# Patient Record
Sex: FEMALE | Race: WHITE
[De-identification: names, ages, dates, MRNs, and addresses within clinical notes are randomized per-mention and may not be internally consistent; named-entity substitution may affect disease eponyms.]

---

## 2017-03-24 ENCOUNTER — HOSPITAL ENCOUNTER (OUTPATIENT)
Dept: HOSPITAL 75 - RAD | Age: 57
End: 2017-03-24
Attending: FAMILY MEDICINE
Payer: COMMERCIAL

## 2017-03-24 DIAGNOSIS — Z12.31: Primary | ICD-10-CM

## 2017-03-24 PROCEDURE — 77067 SCR MAMMO BI INCL CAD: CPT

## 2017-03-27 NOTE — DIAGNOSTIC IMAGING REPORT
EXAMINATION:

Bilateral screening mammogram with a Computer Aided Detection

(CAD) system.



INDICATION:

Screening. No current complaints stated on the questionnaire.



COMPARISON:

03/03/2016.



FINDINGS:

The breasts are composed of heterogeneously dense parenchyma

which may decrease mammographic sensitivity. There is an

asymmetry along the upper aspect of the left MLO view with

apparent architectural distortion, favored to be related to

summation effect of parenchyma. The right breast demonstrates no

significant change. Benign-appearing calcifications are seen.



IMPRESSION:

Focal compression views and ultrasound evaluation for upper left

breast asymmetry would be recommended.



ACR BI-RADS Category 0: Incomplete. (Needs additional imaging

evaluation).

Result letter will be mailed to the patient.

Note: At least 10% of breast cancer is not imaged by mammography.



Dictated by:



Dictated on workstation # [id number]

## 2017-04-06 ENCOUNTER — HOSPITAL ENCOUNTER (OUTPATIENT)
Dept: HOSPITAL 75 - RAD | Age: 57
End: 2017-04-06
Attending: NURSE PRACTITIONER
Payer: COMMERCIAL

## 2017-04-06 DIAGNOSIS — R92.8: Primary | ICD-10-CM

## 2017-04-06 PROCEDURE — 76642 ULTRASOUND BREAST LIMITED: CPT

## 2017-04-06 NOTE — DIAGNOSTIC IMAGING REPORT
Left breast diagnostic mammogram.



CAD is utilized.



COMPARISON: 3/3/2016.



FINDINGS:

The breast parenchyma is heterogeneously dense which may

decrease mammographic sensitivity. There are benign-appearing

calcifications seen. Focal compression view demonstrates

persistent underlying asymmetry with no definite correlate on

the CC projection compression view.



IMPRESSION: Persistent asymmetry along the upper aspect of the

left MLO view with background dense parenchyma. No definite

correlate on the CC projection. This is indeterminate.

Ultrasound correlation is pending.



ACR BI-RADS Category 0: Incomplete. (Needs additional imaging

evaluation).

Result letter will be mailed to the patient.

Note: At least 10% of breast cancer is not imaged by mammography.



Dictated by:



Dictated on workstation # [id number]

## 2017-04-06 NOTE — DIAGNOSTIC IMAGING REPORT
Left breast ultrasound.



INDICATION: Asymmetry along the upper outer aspect of the left

breast.



FINDINGS:

Unremarkable breast parenchyma is seen with no focal lesion.



IMPRESSION: Negative study. The asymmetry in the upper outer

aspect of the left breast is indeterminate. The results are

discussed with the patient. She also indicated that she has

increasing fullness in the upper aspect of the right breast.

Further evaluation with breast MRI is recommended.



ACR BI-RADS Category 0: Incomplete. (Needs additional imaging

evaluation).



Dictated by:



Dictated on workstation # [id number]

## 2017-04-18 ENCOUNTER — HOSPITAL ENCOUNTER (OUTPATIENT)
Dept: HOSPITAL 75 - RAD | Age: 57
End: 2017-04-18
Attending: FAMILY MEDICINE
Payer: COMMERCIAL

## 2017-04-18 DIAGNOSIS — N64.89: Primary | ICD-10-CM

## 2017-04-18 LAB
BUN SERPL-MCNC: 15 MG/DL (ref 7–18)
BUN/CREAT SERPL: 18
CREAT SERPL-MCNC: 0.83 MG/DL (ref 0.6–1.3)
GFR SERPLBLD BASED ON 1.73 SQ M-ARVRAT: > 60 ML/MIN

## 2017-04-18 PROCEDURE — 36415 COLL VENOUS BLD VENIPUNCTURE: CPT

## 2017-04-18 PROCEDURE — 84520 ASSAY OF UREA NITROGEN: CPT

## 2017-04-18 PROCEDURE — 82565 ASSAY OF CREATININE: CPT

## 2017-04-18 PROCEDURE — 77059: CPT

## 2017-04-21 NOTE — DIAGNOSTIC IMAGING REPORT
TECHNIQUE: Utilizing 1.5 Gabi magnet, patient was placed in a

prone position with 8-channel dual breast coil utilized. Axial

STIR precontrasted image and axial T1 fat-sat postcontrast

high-resolution images obtained. Sagittal T2-weighted images

precontrast, bilaterally, as well. Sagittal vibrant temporal

images were obtained pre and post contrast with bolus technique

utilized of gadolinium. Images are postcontrast immediately and

subsequently for 7 minutes. Pre and post contrasted images are

then evaluated with [employer]ruist for evaluation of possible

angiogenesis.



INDICATION: Mammographic abnormality.



COMPARISON: April 6, 2017; March 24, 2017; March 3, 2016;

September 5, 2013; and May 17, 2012.



FINDINGS: The bilateral breast demonstrate mild background

glandularity. The bilateral breasts demonstrate minimal

background enhancement.



No significant axillary or internal mammary adenopathy.



Visualized portions of the upper abdomen are unremarkable.



An 8.1 x 3.2-cm fat-intensity mass is identified within the

right pectoral musculature. No suspicious associated

enhancement.



Minimal similar-appearing scattered foci of enhancement are

identified throughout the bilateral breasts, right greater than

left. These foci are below the color threshold of CAD stream. No

suspicious mass or non-mass enhancement within either breast. In

particular, no suspicious enhancement to correspond to

previously questioned asymmetry within the superior left breast

on prior mammography.



IMPRESSION: No evidence of malignancy. In particular, no

abnormality identified to correspond to the asymmetry within the

superior left breast noted on prior mammography. Followup left

breast mammogram is recommended in six months.



Large fat-density mass within the right pectoral musculature,

felt to relate to a lipoma.



ACR BI-RADS Category 2: Benign findings.



Result letter will be mailed to the patient.



Followup: Diagnostic left breast mammograms are recommended in

six months to monitor stability of the asymmetry within the

superior left breast which is not visualized sonographically or

on this current MRI.



Dictated by:



Dictated on workstation # [id number]

## 2019-01-29 ENCOUNTER — HOSPITAL ENCOUNTER (OUTPATIENT)
Dept: HOSPITAL 75 - RAD | Age: 59
End: 2019-01-29
Attending: OBSTETRICS & GYNECOLOGY
Payer: COMMERCIAL

## 2019-01-29 DIAGNOSIS — Z87.2: ICD-10-CM

## 2019-01-29 DIAGNOSIS — Z12.31: Primary | ICD-10-CM

## 2019-01-29 PROCEDURE — 77067 SCR MAMMO BI INCL CAD: CPT

## 2019-01-29 NOTE — DIAGNOSTIC IMAGING REPORT
EXAMINATION: Digital mammogram bilateral screening with 3D

tomosynthesis.



The current study was also evaluated with a Computer Aided

Detection (CAD) system.



INDICATION: Screening.



This study was compared to the prior exams of 03/24/2017,

03/03/2016, and 09/05/2013. At this time, there are no current

complaints.



FINDINGS: The fibroglandular tissue in both breasts is

heterogeneously dense. This does limit the sensitivity of this

exam. Overall, there does not appear to have been any significant

change when compared to the prior study. No primary or secondary

sign of malignancy is noted. 3D tomographic images fail to show

any sign of malignancy. 



IMPRESSION: There is no radiographic evidence for malignancy.



ACR BI-RADS Category 1: Negative.

Result letter will be mailed to the patient.

Note:  At least 10% of breast cancer is not imaged by

mammography.



Dictated by: 



  Dictated on workstation # [id number]

## 2019-01-31 ENCOUNTER — HOSPITAL ENCOUNTER (OUTPATIENT)
Dept: HOSPITAL 75 - RAD | Age: 59
End: 2019-01-31
Attending: FAMILY MEDICINE
Payer: COMMERCIAL

## 2019-01-31 DIAGNOSIS — Z12.2: Primary | ICD-10-CM

## 2019-01-31 DIAGNOSIS — F17.210: ICD-10-CM

## 2019-01-31 NOTE — DIAGNOSTIC IMAGING REPORT
EXAM: CT CHEST SCREENING WO



INDICATION: 45-pack-year smoking history. Current smoker.



COMPARISON: None



FINDINGS: Calcified granulomas in both lungs. Linear atelectasis

and/or scarring in the lung bases. No suspicious pulmonary nodule

or mass. No endobronchial lesions. No pleural effusion or

pneumothorax. Normal heart size. No pericardial effusion. No

mediastinal, hilar or axillary lymphadenopathy. Visualized upper

abdominal contents are unremarkable. No acute osseous findings.



IMPRESSION:



1. No suspicious pulmonary nodule or mass. Recommend continued

annual screening with low dose chest CT in 12 months.



2. No acute CT findings in the chest.



LUNG-RADS CATEGORY:  1.

MODIFIER:  None.



Please note that the low-dose technique of this chest CT is of

non-diagnostic quality.  This study is only intended for lung

cancer screening of high risk patients.



Dictated by: 



  Dictated on workstation # [id number]

## 2020-01-31 ENCOUNTER — HOSPITAL ENCOUNTER (OUTPATIENT)
Dept: HOSPITAL 75 - RAD | Age: 60
End: 2020-01-31
Attending: FAMILY MEDICINE
Payer: COMMERCIAL

## 2020-01-31 DIAGNOSIS — Z12.31: Primary | ICD-10-CM

## 2020-01-31 PROCEDURE — 77067 SCR MAMMO BI INCL CAD: CPT

## 2020-01-31 NOTE — DIAGNOSTIC IMAGING REPORT
INDICATION: Routine screening.



COMPARISON: Correlation is made with prior studies from

01/29/2019 and 03/24/2017.



TECHNIQUE: 2-D and 3-D bilateral screening mammography was

performed. The current study was also evaluated with a Computer

Aided Detection (CAD) system. 3-D tomosynthesis was also

performed and reviewed.



FINDINGS: Scattered fibroglandular densities are identified

bilaterally. There are benign calcifications bilaterally. No mass

or malignant-appearing microcalcifications are seen. Axillae are

unremarkable.



IMPRESSION: No mammographic features suspicious for malignancy

are identified.



ACR BI-RADS Category 2: Benign findings.

Result letter will be mailed to the patient.

Note: At least 10% of breast cancer is not imaged by mammography.



Dictated by: 



  Dictated on workstation # [id number]

## 2020-02-07 ENCOUNTER — HOSPITAL ENCOUNTER (OUTPATIENT)
Dept: HOSPITAL 75 - RAD | Age: 60
End: 2020-02-07
Attending: FAMILY MEDICINE
Payer: COMMERCIAL

## 2020-02-07 DIAGNOSIS — F17.200: ICD-10-CM

## 2020-02-07 DIAGNOSIS — Z12.2: Primary | ICD-10-CM

## 2020-02-07 NOTE — DIAGNOSTIC IMAGING REPORT
EXAMINATION: CT Chest without contrast (lung screening).



TECHNIQUE: Multiple contiguous axial images were obtained through

the chest without the use of intravenous contrast according to

lung cancer screening protocol. All CT scans use one or more of

the following dose optimizing techniques: automated exposure

control, MA and/or KvP adjustment based on a patient size and

exam type, or iterative reconstruction. 



HISTORY: 45 pack year history of smoking.



COMPARISON: 01/31/2019



FINDINGS: 



There is no edema or pneumonia. No pleural effusion. No

pneumothorax. No suspicious nodules.



Heart size is normal. No pericardial effusion. Aorta is normal in

caliber. There is no axillary or supraclavicular lymphadenopathy.

There is no mediastinal lymphadenopathy.



Limited views of the upper abdomen are normal.



There are no suspicious osseus lesions.



IMPRESSION:



1. No suspicious pulmonary nodules.



LUNG-RADS CATEGORY:  1

MODIFIER:  None.

OTHER SIGNIFICANT FINDINGS:  None.



Dictated by: 



  Dictated on workstation # [id number]

## 2020-09-22 ENCOUNTER — HOSPITAL ENCOUNTER (OUTPATIENT)
Dept: HOSPITAL 75 - SLEEP | Age: 60
Discharge: HOME | End: 2020-09-22
Attending: NURSE PRACTITIONER
Payer: COMMERCIAL

## 2020-09-22 DIAGNOSIS — G47.10: ICD-10-CM

## 2020-09-22 DIAGNOSIS — G47.33: Primary | ICD-10-CM

## 2021-01-13 ENCOUNTER — HOSPITAL ENCOUNTER (OUTPATIENT)
Dept: HOSPITAL 75 - LABNPT | Age: 61
End: 2021-01-13
Attending: OTOLARYNGOLOGY
Payer: COMMERCIAL

## 2021-01-13 DIAGNOSIS — Z20.822: Primary | ICD-10-CM

## 2021-01-13 PROCEDURE — 87635 SARS-COV-2 COVID-19 AMP PRB: CPT

## 2021-01-15 ENCOUNTER — HOSPITAL ENCOUNTER (OUTPATIENT)
Dept: HOSPITAL 75 - SLEEP | Age: 61
End: 2021-01-15
Attending: NURSE PRACTITIONER
Payer: COMMERCIAL

## 2021-01-15 DIAGNOSIS — G47.33: Primary | ICD-10-CM

## 2021-01-15 PROCEDURE — 95811 POLYSOM 6/>YRS CPAP 4/> PARM: CPT

## 2021-03-09 ENCOUNTER — HOSPITAL ENCOUNTER (OUTPATIENT)
Dept: HOSPITAL 75 - CARD | Age: 61
End: 2021-03-09
Attending: FAMILY MEDICINE
Payer: COMMERCIAL

## 2021-03-09 DIAGNOSIS — F17.210: ICD-10-CM

## 2021-03-09 DIAGNOSIS — Z12.31: Primary | ICD-10-CM

## 2021-03-09 DIAGNOSIS — I25.10: ICD-10-CM

## 2021-03-09 DIAGNOSIS — Z86.16: ICD-10-CM

## 2021-03-09 PROCEDURE — 71271 CT THORAX LUNG CANCER SCR C-: CPT

## 2021-03-09 PROCEDURE — 93017 CV STRESS TEST TRACING ONLY: CPT

## 2021-03-09 PROCEDURE — 77067 SCR MAMMO BI INCL CAD: CPT

## 2021-03-09 PROCEDURE — 93306 TTE W/DOPPLER COMPLETE: CPT

## 2021-03-09 PROCEDURE — 77063 BREAST TOMOSYNTHESIS BI: CPT

## 2021-03-09 NOTE — DIAGNOSTIC IMAGING REPORT
EXAMINATION: CT Chest without contrast (lung screening).



TECHNIQUE: Multiple contiguous axial images were obtained through

the chest without the use of intravenous contrast according to

lung cancer screening protocol. All CT scans use one or more of

the following dose optimizing techniques: automated exposure

control, MA and/or KvP adjustment based on a patient size and

exam type, or iterative reconstruction. 



HISTORY: 46 pack year history of smoking.



COMPARISON: 02/07/2020



FINDINGS: 



There is no edema or pneumonia. No pleural effusion. No

pneumothorax. No suspicious nodules.



There is no axillary or supraclavicular lymphadenopathy. There is

no mediastinal lymphadenopathy. A large vessel pectoral

fat-containing lesions likely a lipoma. Heart size is normal.

There are no coronary artery calcifications.  No pericardial

effusion. Aorta is normal in caliber.



Limited views of the upper abdomen are unremarkable.



There are no suspicious osseous lesions.



IMPRESSION:



1. No suspicious pulmonary nodules.



LUNG-RADS CATEGORY:  1

MODIFIER:  None.



Dictated by: 



  Dictated on workstation # [id number]

## 2021-03-09 NOTE — DIAGNOSTIC IMAGING REPORT
INDICATION: Routine screening.



COMPARISON is made with prior mammograms 01/31/2020 and

01/29/2019.



2-D and 3-D bilateral screening mammography was performed with

CAD.



Scattered fibroglandular densities are identified bilaterally.

There is a small ovoid circumscribed density in the posterior

right breast at the nipple line on the CC view. This appears to

be inferiorly located but not well-seen on the MLO view.

Additional views are recommended. Left breast is unremarkable.

There are benign calcifications bilaterally. Axillae are

unremarkable. 



IMPRESSION: BI-RADS 0



Circumscribed nodule right breast. Additional views are

recommended for further evaluation.



ACR BI-RADS Category 0: Incomplete. (Needs additional imaging

evaluation).

Result letter will be mailed to the patient.

Note: At least 10% of breast cancer is not imaged by mammography.



Dictated by: 



  Dictated on workstation # [id number]

## 2021-03-12 ENCOUNTER — HOSPITAL ENCOUNTER (OUTPATIENT)
Dept: HOSPITAL 75 - RAD | Age: 61
End: 2021-03-12
Attending: FAMILY MEDICINE
Payer: COMMERCIAL

## 2021-03-12 DIAGNOSIS — R92.2: Primary | ICD-10-CM

## 2021-03-12 PROCEDURE — 77065 DX MAMMO INCL CAD UNI: CPT

## 2021-03-12 PROCEDURE — 76642 ULTRASOUND BREAST LIMITED: CPT

## 2021-03-12 NOTE — DIAGNOSTIC IMAGING REPORT
INDICATION: Right breast density. Patient presents for additional

views.



Correlation is made with screening study from 03/09/2021.



Unilateral right 2-D and 3-D diagnostic mammography was

performed. This included spot compression CC, rolled CC as well

as conventional 90 degrees lateral views.



Additional views show persistent tiny ovoid density in the

inferior right breast approximately asymmetric from the nipple.

No definite correlate density on the MLO view is seen. No other

abnormalities are detected.



IMPRESSION: BI-RADS 0



Tiny persistent density inferior right breast approximately 6

o'clock location, 8 cm from the nipple. Further evaluation with

ultrasound is recommended and will be performed today.



ACR BI-RADS Category 0: Incomplete. (Needs additional imaging

evaluation).

Result letter will be mailed to the patient.

Note: At least 10% of breast cancer is not imaged by mammography.



Dictated by: 



  Dictated on workstation # [id number]

## 2021-03-12 NOTE — DIAGNOSTIC IMAGING REPORT
INDICATION: Right breast density.



Correlation is made with diagnostic mammogram earlier same day

and screening mammogram from 03/09/2021.



Sonographic interrogation of the inferior right breast was

performed. There is a tiny cyst 6 o'clock location approximately

5 mm x 2 mm x 3 mm. However this is much closer to the nipple

than the lesion noted mammographically. No other sonographic

abnormality is seen. No solid masses are detected.



IMPRESSION: BI-RADS Category 3



Tiny cyst at the 6 o'clock retroareolar location of the right

breast, likely not accounting for the mammographic density.

Mammographic density does have a fairly benign appearance

however. Even so, followup right mammogram in 6 months is

recommended to show continued stability.



ACR BI-RADS Category 3: Probably benign findings.

Result letter will be mailed to the patient.

Note: At least 10% of breast cancer is not imaged by mammography.



Dictated by: 



  Dictated on workstation # [id number]

## 2021-07-28 ENCOUNTER — HOSPITAL ENCOUNTER (OUTPATIENT)
Dept: HOSPITAL 75 - REHAB | Age: 61
Discharge: HOME | End: 2021-07-28
Attending: ORTHOPAEDIC SURGERY
Payer: COMMERCIAL

## 2021-07-28 DIAGNOSIS — M06.9: ICD-10-CM

## 2021-07-28 DIAGNOSIS — F17.210: ICD-10-CM

## 2021-07-28 DIAGNOSIS — Z96.612: ICD-10-CM

## 2021-07-28 DIAGNOSIS — Z47.1: Primary | ICD-10-CM

## 2021-07-28 DIAGNOSIS — F32.9: ICD-10-CM

## 2021-07-28 DIAGNOSIS — Z96.653: ICD-10-CM

## 2023-06-19 ENCOUNTER — HOSPITAL ENCOUNTER (OUTPATIENT)
Dept: HOSPITAL 75 - RAD | Age: 63
End: 2023-06-19
Attending: FAMILY MEDICINE
Payer: COMMERCIAL

## 2023-06-19 DIAGNOSIS — E04.1: ICD-10-CM

## 2023-06-19 DIAGNOSIS — Z12.31: Primary | ICD-10-CM

## 2023-06-19 PROCEDURE — 77063 BREAST TOMOSYNTHESIS BI: CPT

## 2023-06-19 PROCEDURE — 77067 SCR MAMMO BI INCL CAD: CPT

## 2023-06-19 PROCEDURE — 76536 US EXAM OF HEAD AND NECK: CPT

## 2023-06-19 NOTE — DIAGNOSTIC IMAGING REPORT
PROCEDURE: US Thyroid.



TECHNIQUE: Multiple real-time grayscale images were obtained of

the thyroid in various projections.



INDICATION:  Evaluate for thyroid nodules.



COMPARISON:  None.



FINDINGS: 

Both thyroid lobes demonstrate smooth and homogenous background

echotexture. Color flow Doppler demonstrates normal and symmetric

vascularity bilaterally. The right lobe measures 2.9 cm in

length, 1.2 cm AP, and 1.4 cm transverse. The left lobe measures

4.1 cm in length, 1.2 cm AP, and 1.4 cm transverse. The isthmus

measures 0.3 cm.



A tiny cystic nodule seen in the mid aspect of the right lobe of

the thyroid measuring 0.2 cm.



IMPRESSION:

1. Unremarkable thyroid sonogram. 



Dictated by: 



  Dictated on workstation # [id number]

## 2023-06-19 NOTE — DIAGNOSTIC IMAGING REPORT
INDICATION: Routine screening.



Comparison is made with prior mammogram from 3/9/2021 and

1/31/2020.



2-D and 3-D bilateral screening mammography was performed with

CAD.



Both breasts are heterogeneously dense, limiting the sensitivity

of mammography. There are scattered benign calcifications in both

breasts. Previously noted ovoid density in the deep right breast

best seen on the CC view appears stable. No new mass or

malignant-appearing microcalcifications are seen. Axillae are

unremarkable.



IMPRESSION:  No mammographic features suspicious for malignancy

are identified.



ACR BI-RADS Category 2: Benign findings.

Result letter will be mailed to the patient.

Note: At least 10% of breast cancer is not imaged by mammography.





BI-RADS Category 2



Dictated by: 



  Dictated on workstation # [id number]

## 2023-09-14 ENCOUNTER — HOSPITAL ENCOUNTER (OUTPATIENT)
Dept: HOSPITAL 75 - RAD | Age: 63
End: 2023-09-14
Attending: NURSE PRACTITIONER
Payer: COMMERCIAL

## 2023-09-14 DIAGNOSIS — Z12.2: Primary | ICD-10-CM

## 2023-09-14 DIAGNOSIS — F17.200: ICD-10-CM

## 2023-09-14 PROCEDURE — 71271 CT THORAX LUNG CANCER SCR C-: CPT

## 2023-09-14 NOTE — DIAGNOSTIC IMAGING REPORT
CT Lung Screening



INDICATION: 20 pack year smoking history, current smoker for

annual low-dose CT screening.



TECHNIQUE: Noncontrast, low-dose CT imaging performed according

to the lung cancer screening protocol. Auto Exposure Controls

were utilize during the CT exam to meet ALARA standards for

radiation dose reduction.



COMPARISON:03/09/2021



FINDINGS: A few scattered benign calcified subcentimeter

subpleural granulomata, stable. No noncalcified or suspicious

lung mass. No findings of lung cancer. There is no thoracic

adenopathy. No effusion or pneumothorax. Chronic right chest wall

lipoma stable. No acute or suspicious chest wall abnormality. The

aorta is nonaneurysmal. There are some coronary artery

atherosclerotic vascular calcifications, chronic. No effusion. No

pneumothorax. No findings of edema or pneumonia.  The visible

upper abdomen nonacute. 



IMPRESSION: No findings of lung cancer or acute abnormalities.

Continued annual low-dose CT screening. Follow-up in one year's

time recommended.



LUNG-RADS CATEGORY:Category 2

MODIFIER:None

OTHER SIGNIFICANT FINDINGS:Benign granulomatous disease and

coronary artery atherosclerosis.



Dictated by: 



  Dictated on workstation # [id number]